# Patient Record
Sex: MALE | Race: WHITE | Employment: FULL TIME | URBAN - METROPOLITAN AREA
[De-identification: names, ages, dates, MRNs, and addresses within clinical notes are randomized per-mention and may not be internally consistent; named-entity substitution may affect disease eponyms.]

---

## 2021-06-15 ENCOUNTER — HOSPITAL ENCOUNTER (OUTPATIENT)
Age: 56
Discharge: HOME | End: 2021-06-15
Payer: COMMERCIAL

## 2021-06-15 ENCOUNTER — APPOINTMENT (OUTPATIENT)
Dept: GENERAL RADIOLOGY | Age: 56
End: 2021-06-15
Attending: NURSE PRACTITIONER
Payer: COMMERCIAL

## 2021-06-15 VITALS
WEIGHT: 205 LBS | HEIGHT: 68 IN | HEART RATE: 79 BPM | OXYGEN SATURATION: 97 % | DIASTOLIC BLOOD PRESSURE: 70 MMHG | RESPIRATION RATE: 16 BRPM | TEMPERATURE: 98 F | SYSTOLIC BLOOD PRESSURE: 143 MMHG | BODY MASS INDEX: 31.07 KG/M2

## 2021-06-15 DIAGNOSIS — S99.922D INJURY OF LEFT FOOT, SUBSEQUENT ENCOUNTER: Primary | ICD-10-CM

## 2021-06-15 PROCEDURE — 73630 X-RAY EXAM OF FOOT: CPT | Performed by: NURSE PRACTITIONER

## 2021-06-15 PROCEDURE — 99203 OFFICE O/P NEW LOW 30 MIN: CPT

## 2021-06-15 RX ORDER — TRAMADOL HYDROCHLORIDE 50 MG/1
TABLET ORAL EVERY 6 HOURS PRN
Qty: 10 TABLET | Refills: 0 | Status: SHIPPED | OUTPATIENT
Start: 2021-06-15 | End: 2021-06-22

## 2021-06-16 NOTE — ED INITIAL ASSESSMENT (HPI)
Left dorsal foot pain, swelling, and redness. No trauma. Seen by PMD last week and given naproxen with some relief of swelling. Continues to have pain. + distal CMS. No fever.

## 2021-06-16 NOTE — ED PROVIDER NOTES
Patient Seen in: [facility]    History   Patient presents with:   Foot Pain    Stated Complaint: foot pain    HPI    HPI: Arnoldo Amaral is a 55year old male who presents after an injury to the left foot that occurred 3 weeks ago after steppin file    Alcohol use: Not on file    Drug use: Not on file      Review of Systems    Positive for stated complaint: foot pain  Other systems are as noted in HPI. Constitutional and vital signs reviewed.       All other systems reviewed and negative except a patient evaluation without personal hand/facial/oropharyngeal contact and gloves worn throughout encounter. See note and/or contact this provider for further PPE details.     This patient was seen, examined, and treated during the pandemic of the coronaviru tablets ( mg total) by mouth every 6 (six) hours as needed for Pain.   Qty: 10 tablet Refills: 0

## 2022-05-20 ENCOUNTER — HOSPITAL ENCOUNTER (OUTPATIENT)
Age: 57
Discharge: HOME | End: 2022-05-20
Payer: COMMERCIAL

## 2022-05-20 ENCOUNTER — APPOINTMENT (OUTPATIENT)
Dept: GENERAL RADIOLOGY | Age: 57
End: 2022-05-20
Attending: NURSE PRACTITIONER
Payer: COMMERCIAL

## 2022-05-20 VITALS
SYSTOLIC BLOOD PRESSURE: 170 MMHG | BODY MASS INDEX: 32.21 KG/M2 | RESPIRATION RATE: 18 BRPM | HEART RATE: 84 BPM | DIASTOLIC BLOOD PRESSURE: 63 MMHG | WEIGHT: 215 LBS | HEIGHT: 68.5 IN | OXYGEN SATURATION: 98 % | TEMPERATURE: 98 F

## 2022-05-20 DIAGNOSIS — M70.32 BURSITIS OF LEFT ELBOW, UNSPECIFIED BURSA: Primary | ICD-10-CM

## 2022-05-20 PROCEDURE — 99203 OFFICE O/P NEW LOW 30 MIN: CPT | Performed by: NURSE PRACTITIONER

## 2022-05-20 PROCEDURE — 73080 X-RAY EXAM OF ELBOW: CPT | Performed by: NURSE PRACTITIONER

## 2022-05-20 RX ORDER — SULFAMETHOXAZOLE AND TRIMETHOPRIM 800; 160 MG/1; MG/1
1 TABLET ORAL 2 TIMES DAILY
Qty: 20 TABLET | Refills: 0 | Status: SHIPPED | OUTPATIENT
Start: 2022-05-20 | End: 2022-05-30

## 2022-05-20 NOTE — ED INITIAL ASSESSMENT (HPI)
Pt here w c/o L elbow pain/redness/swelling/warmth and area of dry skin with induration under it. Pt states he had the area of dry skin but woke with pain/redness today. Denies any injury.